# Patient Record
Sex: FEMALE | Race: WHITE | NOT HISPANIC OR LATINO | Employment: OTHER | URBAN - METROPOLITAN AREA
[De-identification: names, ages, dates, MRNs, and addresses within clinical notes are randomized per-mention and may not be internally consistent; named-entity substitution may affect disease eponyms.]

---

## 2024-01-10 ENCOUNTER — TELEPHONE (OUTPATIENT)
Dept: OBGYN CLINIC | Facility: HOSPITAL | Age: 59
End: 2024-01-10

## 2024-01-10 NOTE — TELEPHONE ENCOUNTER
Caller: Patient    Doctor: Cesario    Reason for call: Patient is upset because she is getting a bill for $259 from [email or web address] in [place], [place] in regards to a synovasure test on fluid from her knee back on 7/27/2023.  She said that the lab is telling her they do not participate with any insurance.  She called her [employer] insurance and they said the same thing that they will not pay it.  Patient does not think she should be responsible for it since she was never told that it might not be covered.  She would like the  to call her back in regards to this.    Call back#: [phone]

## 2024-01-22 ENCOUNTER — TELEPHONE (OUTPATIENT)
Dept: OBGYN CLINIC | Facility: CLINIC | Age: 59
End: 2024-01-22

## 2024-01-24 ENCOUNTER — TELEPHONE (OUTPATIENT)
Dept: OBGYN CLINIC | Facility: CLINIC | Age: 59
End: 2024-01-24

## 2024-01-24 NOTE — TELEPHONE ENCOUNTER
Pt. Returned call on 1.23.24 @4pm.    Spoke to patient at 3:00pm regarding billing. Patient was kind and understood the billing. I will mail a copy of the information to her when she changes the address in her [employer] account to the address she requested of:     [address] [place] [address]

## 2024-01-29 ENCOUNTER — TELEPHONE (OUTPATIENT)
Dept: OBGYN CLINIC | Facility: CLINIC | Age: 59
End: 2024-01-29

## 2024-01-29 NOTE — TELEPHONE ENCOUNTER
1.25.24 patient called and LVM asked information to be mailed to address in chart.    1.29.24 3:00pm spoke to patient advised will mail to [address]. Patient satisfied.

## 2024-02-21 PROBLEM — U07.1 PNEUMONIA DUE TO COVID-19 VIRUS: Status: RESOLVED | Noted: 2020-11-18 | Resolved: 2024-02-21

## 2024-02-21 PROBLEM — J12.82 PNEUMONIA DUE TO COVID-19 VIRUS: Status: RESOLVED | Noted: 2020-11-18 | Resolved: 2024-02-21

## 2024-04-04 DIAGNOSIS — E78.2 MIXED HYPERLIPIDEMIA: ICD-10-CM

## 2024-04-04 DIAGNOSIS — I10 BENIGN ESSENTIAL HYPERTENSION: ICD-10-CM

## 2024-04-04 RX ORDER — METOPROLOL SUCCINATE 100 MG/1
100 TABLET, EXTENDED RELEASE ORAL DAILY
Qty: 90 TABLET | Refills: 0 | Status: SHIPPED | OUTPATIENT
Start: 2024-04-04

## 2024-04-04 RX ORDER — SIMVASTATIN 10 MG
10 TABLET ORAL
Qty: 90 TABLET | Refills: 0 | OUTPATIENT
Start: 2024-04-04

## 2024-04-04 RX ORDER — SIMVASTATIN 10 MG
10 TABLET ORAL
Qty: 90 TABLET | Refills: 0 | Status: SHIPPED | OUTPATIENT
Start: 2024-04-04

## 2024-04-04 RX ORDER — METOPROLOL SUCCINATE 100 MG/1
100 TABLET, EXTENDED RELEASE ORAL DAILY
Qty: 90 TABLET | Refills: 0 | OUTPATIENT
Start: 2024-04-04

## 2024-05-21 ENCOUNTER — TELEPHONE (OUTPATIENT)
Age: 59
End: 2024-05-21

## 2024-05-21 NOTE — TELEPHONE ENCOUNTER
Caller: patient    Doctor: Cesario    Reason for call: pt returned the office phone call. She has an upcoming appt with the doctor and will  a card at that appt    Call back#: [phone]

## 2024-05-21 NOTE — TELEPHONE ENCOUNTER
Caller: patient     Doctor: Cesario     Reason for call: asking for a card stating she has 2 knee replacements so she can [place] a friend [place],  they will not allow entrance without something in writing.     Call back#: [phone]

## 2024-05-28 ENCOUNTER — OFFICE VISIT (OUTPATIENT)
Dept: OBGYN CLINIC | Facility: CLINIC | Age: 59
End: 2024-05-28
Payer: COMMERCIAL

## 2024-05-28 ENCOUNTER — APPOINTMENT (OUTPATIENT)
Dept: RADIOLOGY | Facility: CLINIC | Age: 59
End: 2024-05-28
Payer: COMMERCIAL

## 2024-05-28 VITALS
SYSTOLIC BLOOD PRESSURE: 135 MMHG | HEIGHT: 66 IN | HEART RATE: 69 BPM | BODY MASS INDEX: 28.45 KG/M2 | WEIGHT: 177 LBS | DIASTOLIC BLOOD PRESSURE: 90 MMHG

## 2024-05-28 DIAGNOSIS — Z96.651 S/P TOTAL KNEE REPLACEMENT, RIGHT: ICD-10-CM

## 2024-05-28 DIAGNOSIS — Z96.652 S/P TOTAL KNEE REPLACEMENT, LEFT: ICD-10-CM

## 2024-05-28 DIAGNOSIS — Z96.652 S/P TOTAL KNEE REPLACEMENT, LEFT: Primary | ICD-10-CM

## 2024-05-28 PROCEDURE — 99213 OFFICE O/P EST LOW 20 MIN: CPT | Performed by: ORTHOPAEDIC SURGERY

## 2024-05-28 PROCEDURE — 73560 X-RAY EXAM OF KNEE 1 OR 2: CPT

## 2024-05-28 NOTE — PROGRESS NOTES
"Patient Name:  Cecilia Lazo  MRN:  [id number]    Assessment & Plan     1. S/P total knee replacement, left  -     XR knee 1 or 2 vw left; Future; Expected date: 05/28/2024  2. S/P total knee replacement, right  -     XR knee 1 or 2 vw right; Future; Expected date: 05/28/2024    Approximately  1 year s/p Left total knee arthroplasty performed on 04/19/2023, 2 years Right total knee performed 05/11/2022  X-rays reviewed in office today with patient  Overall, patient doing very well s/p operative management  She was provided a card stating she has total knee arthroplasty implants  Continue activity as tolerated  Follow up 1 year for reevaluation and repeat bilateral AP/Lateral knee x-ray    History of the Present Illness   Cecilia Lazo is a 59 y.o. female approximately 1 year s/p Left total knee arthroplasty performed on 04/19/2023, 2 years Right total knee performed 05/11/2022. Today the patient reports she is doing very well. She is able to do most of her ADLs. She has difficulty kneeling. The patient feels her left knee is still gaining strength, and not quite as strong as her right knee. She has been painting her trim and has not had an increase in symptoms with this.            Review of Systems     Review of Systems   Constitutional:  Negative for chills and fever.   HENT:  Negative for congestion.    Respiratory:  Negative for cough, chest tightness and shortness of breath.    Cardiovascular:  Negative for chest pain and palpitations.   Gastrointestinal:  Negative for abdominal pain.   Endocrine: Negative for cold intolerance and heat intolerance.   Neurological:  Negative for syncope.   Psychiatric/Behavioral:  Negative for confusion.        Physical Exam     /90   Pulse 69   Ht 5' 6\" (1.676 m)   Wt 80.3 kg (177 lb)   BMI 28.57 kg/m²     Left Knee  Surgical incision well healed   Range of motion from 0 to 130 degrees   There is trace effusion.    There is no tenderness over the knee.    The " patient is able to perform a straight leg raise with 5/5 quad strength.     Varus stress testing reveals no pain or instability at 0 and 30 degrees   Valgus stress testing reveals no pain or instability at 0 and 30 degrees  Calf soft, compressible and nontender  The patient is neurovascular intact distally.    Right knee  Surgical incision well healed   There is no effusion  Range of motion 0 to 125 degrees  No tenderness over the knee  Able to perform straight leg raise and 5/5 quad strength  Varus stress testing reveals no pain or instability at 0 and 30 degrees   Valgus stress testing reveals no pain or instability at 0 and 30 degrees  Calf soft, compressible and nontender  Neurovascularly intact distally    Data Review     I have personally reviewed pertinent films in PACS, and my interpretation follows.    X-rays performed 5/28/2024 of Left knee independently reviewed and demonstrate total knee prosthesis in appropriate alignment without evidence of fracture, dislocation, loosening or lucency    X-rays performed 5/28/2024 of Right knee independently reviewed and demonstrate total knee prosthesis in appropriate alignment without evidence of fracture, dislocation, loosening or lucency    Social History     Tobacco Use   • Smoking status: Never   • Smokeless tobacco: Never   Vaping Use   • Vaping status: Never Used   Substance Use Topics   • Alcohol use: Not Currently     Comment: socially   • Drug use: No           Procedures  None     Aurora Mayes   Scribe Attestation    I,:  Aurora Mayes am acting as a scribe while in the presence of the attending physician.:       I,:  Sudhir Nassar, DO personally performed the services described in this documentation    as scribed in my presence.:

## 2024-05-29 ENCOUNTER — OFFICE VISIT (OUTPATIENT)
Dept: FAMILY MEDICINE CLINIC | Facility: CLINIC | Age: 59
End: 2024-05-29
Payer: COMMERCIAL

## 2024-05-29 VITALS
HEIGHT: 66 IN | DIASTOLIC BLOOD PRESSURE: 82 MMHG | WEIGHT: 191.6 LBS | OXYGEN SATURATION: 98 % | TEMPERATURE: 98.8 F | RESPIRATION RATE: 18 BRPM | BODY MASS INDEX: 30.79 KG/M2 | HEART RATE: 83 BPM | SYSTOLIC BLOOD PRESSURE: 136 MMHG

## 2024-05-29 DIAGNOSIS — E03.8 SUBCLINICAL HYPOTHYROIDISM: ICD-10-CM

## 2024-05-29 DIAGNOSIS — I10 BENIGN ESSENTIAL HYPERTENSION: Primary | ICD-10-CM

## 2024-05-29 DIAGNOSIS — Z12.31 ENCOUNTER FOR SCREENING MAMMOGRAM FOR BREAST CANCER: ICD-10-CM

## 2024-05-29 DIAGNOSIS — F43.9 STRESS AT HOME: ICD-10-CM

## 2024-05-29 DIAGNOSIS — E78.2 MIXED HYPERLIPIDEMIA: ICD-10-CM

## 2024-05-29 DIAGNOSIS — Z12.11 SCREENING FOR COLON CANCER: ICD-10-CM

## 2024-05-29 PROCEDURE — 99214 OFFICE O/P EST MOD 30 MIN: CPT | Performed by: NURSE PRACTITIONER

## 2024-05-29 RX ORDER — SIMVASTATIN 10 MG
10 TABLET ORAL
Qty: 90 TABLET | Refills: 1 | Status: SHIPPED | OUTPATIENT
Start: 2024-05-29

## 2024-05-29 RX ORDER — CLONAZEPAM 1 MG/1
1 TABLET ORAL DAILY PRN
Qty: 30 TABLET | Refills: 0 | Status: SHIPPED | OUTPATIENT
Start: 2024-05-29

## 2024-05-29 RX ORDER — METOPROLOL SUCCINATE 100 MG/1
100 TABLET, EXTENDED RELEASE ORAL DAILY
Qty: 90 TABLET | Refills: 1 | Status: SHIPPED | OUTPATIENT
Start: 2024-05-29

## 2024-05-29 NOTE — PROGRESS NOTES
OFFICE VISIT  Cecilia Lazo 59 y.o. female MRN: [id number]      Depression Screening and Follow-up Plan: Patient was screened for depression during today's encounter. They screened negative with a PHQ-9 score of 0.         Assessment / Plan:  Problem List Items Addressed This Visit          Cardiovascular and Mediastinum    Benign essential hypertension - Primary (Chronic)     Blood pressure stable, no current change in medication regimen.  Encourage low-sodium diet         Relevant Medications    metoprolol succinate (TOPROL-XL) 100 mg 24 hr tablet       Endocrine    Subclinical hypothyroidism     Routine follow up labs ordered.          Relevant Medications    metoprolol succinate (TOPROL-XL) 100 mg 24 hr tablet       Behavioral Health    Stress at home     As needed use of clonazepam, PDMP portal reviewed, no red flags         Relevant Medications    clonazePAM (KlonoPIN) 1 mg tablet       Other    Hyperlipidemia     Heart healthy diet, continue medication regimen as prescribed         Relevant Medications    simvastatin (ZOCOR) 10 mg tablet     Other Visit Diagnoses       Encounter for screening mammogram for breast cancer        Relevant Orders    Mammo screening bilateral w 3d & cad    Screening for colon cancer        Relevant Orders    Ambulatory Referral to Gastroenterology              Reason For Visit / Chief Complaint  Chief Complaint   Patient presents with    Follow-up        HPI:  Cecilia Lazo is a 59 y.o. female who presents today for routine follow up. Has forgotten to get labs done prior visit.  No new complaints    Historical Information   Past Medical History:   Diagnosis Date    Hyperlipidemia     Hypertension     Menopause     takes prozac for night sweats per gyn    Night sweats     related to menopause    Pneumonia      Past Surgical History:   Procedure Laterality Date    BREAST BIOPSY Left 6yrs ago  benign    DILATION AND CURETTAGE OF UTERUS      ablation    FOOT SURGERY Left 2014     INSERTION SUBTAILOR JOINT IMPLANT Right 12/14/2018    Procedure: FOOT EXPLORATION SUBTALAR JOINT WITH IMPLANT;  Surgeon: Mariel Orona DPM;  Location:  MAIN OR;  Service: Podiatry    JOINT REPLACEMENT Right     knee    KNEE ARTHROSCOPY W/ MENISCAL REPAIR Left     KNEE SURGERY      WI ARTHRP KNE CONDYLE&PLATU MEDIAL&LAT COMPARTMENTS Right 05/11/2022    Procedure: ARTHROPLASTY KNEE TOTAL- Right total knee arthroplasty;  Surgeon: Sudhir Nassar DO;  Location: [facility];  Service: Orthopedics    WI ARTHRP KNE CONDYLE&PLATU MEDIAL&LAT COMPARTMENTS Left 4/19/2023    Procedure: ARTHROPLASTY KNEE TOTAL- Left total knee arthroplasty;  Surgeon: Sudhir Nassar DO;  Location: [facility];  Service: Orthopedics    WI CORRJ HLX VLGS BNCTY SESMDC DSTL METAR OSTEOT Right 12/14/2018    Procedure: FOOT TRAVIS BUNIONECTOMY;  Surgeon: Mariel Orona DPM;  Location:  MAIN OR;  Service: Podiatry    TUBAL LIGATION       Social History   Social History     Substance and Sexual Activity   Alcohol Use Not Currently    Comment: socially     Social History     Substance and Sexual Activity   Drug Use No     Social History     Tobacco Use   Smoking Status Never   Smokeless Tobacco Never     Family History   Problem Relation Age of Onset    Hypertension Mother     Diabetes Mother     Hypertension Father     No Known Problems Sister     No Known Problems Daughter     No Known Problems Maternal Grandmother     No Known Problems Paternal Grandmother     No Known Problems Sister     No Known Problems Maternal Aunt     No Known Problems Paternal Aunt        Meds/Allergies   No Known Allergies    Meds:    Current Outpatient Medications:     clonazePAM (KlonoPIN) 1 mg tablet, Take 1 tablet (1 mg total) by mouth daily as needed for anxiety, Disp: 30 tablet, Rfl: 0    estradiol (ESTRACE) 0.1 mg/g vaginal cream, insert 1 TO 2 grams vaginally at bedtime for 2 weeks then TWICE WEEKLY, Disp: , Rfl:     metoprolol succinate (TOPROL-XL) 100 mg 24  "hr tablet, Take 1 tablet (100 mg total) by mouth daily, Disp: 90 tablet, Rfl: 1    simvastatin (ZOCOR) 10 mg tablet, Take 1 tablet (10 mg total) by mouth daily at bedtime, Disp: 90 tablet, Rfl: 1      REVIEW OF SYSTEMS  Review of Systems   Constitutional:  Negative for activity change, chills, fatigue and fever.   HENT:  Negative for congestion, ear discharge, ear pain, sinus pressure, sinus pain, sore throat, tinnitus and trouble swallowing.    Eyes:  Negative for photophobia, pain, discharge, itching and visual disturbance.   Respiratory:  Negative for cough, chest tightness, shortness of breath and wheezing.    Cardiovascular:  Negative for chest pain and leg swelling.   Gastrointestinal:  Negative for abdominal distention, abdominal pain, constipation, diarrhea, nausea and vomiting.   Endocrine: Negative for polydipsia, polyphagia and polyuria.   Genitourinary:  Negative for dysuria and frequency.   Musculoskeletal:  Negative for arthralgias, myalgias, neck pain and neck stiffness.   Skin:  Negative for color change.   Neurological:  Negative for dizziness, syncope, weakness, numbness and headaches.   Hematological:  Does not bruise/bleed easily.   Psychiatric/Behavioral:  Negative for behavioral problems, confusion, self-injury, sleep disturbance and suicidal ideas. The patient is not nervous/anxious.            Current Vitals:   Blood Pressure: 136/82 (05/29/24 1102)  Pulse: 83 (05/29/24 1102)  Temperature: 98.8 °F (37.1 °C) (05/29/24 1102)  Temp Source: Tympanic (05/29/24 1102)  Respirations: 18 (05/29/24 1102)  Height: 5' 6\" (167.6 cm) (05/29/24 1102)  Weight - Scale: 86.9 kg (191 lb 9.6 oz) (05/29/24 1102)  SpO2: 98 % (05/29/24 1102)  [unfilled]    PHYSICAL EXAMS:  Physical Exam  Vitals and nursing note reviewed.   Constitutional:       Appearance: Normal appearance.   HENT:      Head: Normocephalic and atraumatic.   Cardiovascular:      Rate and Rhythm: Normal rate and regular rhythm.      Pulses: Normal " pulses.      Heart sounds: Normal heart sounds.   Pulmonary:      Effort: Pulmonary effort is normal.      Breath sounds: Normal breath sounds.   Musculoskeletal:      Cervical back: Normal range of motion.   Skin:     General: Skin is warm.   Neurological:      General: No focal deficit present.      Mental Status: She is alert and oriented to person, place, and time.   Psychiatric:         Mood and Affect: Mood normal.         Behavior: Behavior normal.         Thought Content: Thought content normal.         Judgment: Judgment normal.             Lab, imaging and other studies: I have personally reviewed pertinent reports.  .

## 2024-06-07 ENCOUNTER — HOSPITAL ENCOUNTER (OUTPATIENT)
Dept: MAMMOGRAPHY | Facility: HOSPITAL | Age: 59
End: 2024-06-07
Payer: COMMERCIAL

## 2024-06-07 DIAGNOSIS — Z12.31 ENCOUNTER FOR SCREENING MAMMOGRAM FOR BREAST CANCER: ICD-10-CM

## 2024-06-07 PROCEDURE — 77063 BREAST TOMOSYNTHESIS BI: CPT

## 2024-06-07 PROCEDURE — 77067 SCR MAMMO BI INCL CAD: CPT

## 2024-06-11 ENCOUNTER — NURSE TRIAGE (OUTPATIENT)
Dept: PHYSICAL THERAPY | Facility: OTHER | Age: 59
End: 2024-06-11

## 2024-06-11 DIAGNOSIS — G89.29 ACUTE EXACERBATION OF CHRONIC LOW BACK PAIN: Primary | ICD-10-CM

## 2024-06-11 DIAGNOSIS — M54.50 ACUTE EXACERBATION OF CHRONIC LOW BACK PAIN: Primary | ICD-10-CM

## 2024-06-11 NOTE — TELEPHONE ENCOUNTER
"NO REF.    Additional Information   Negative: Is this related to a work injury?   Negative: Is this related to an MVA?   Negative: Are you currently recieving homecare services?    Background - Initial Assessment  Clinical complaint: Right Sided Lower Back Pain that started \"about a week ago\". Hx of back pain in the past but never has seek any treatment. Patient states no radiation to her lower extremities or upper back, neck or shoulders. Pain is centralized in her LB. No numbness or tingling. NKI. Pain is persistent, worse when bending over. Not seeing a spine specialist for this pain at the moment.  Patient described pain as sharp and aching.  Date of onset: a week ago (new flare up)  Frequency of pain: persistent.  Quality of pain: aching and sharp.    Protocols used: [facility] Protocol    "

## 2024-06-11 NOTE — TELEPHONE ENCOUNTER
Additional Information   Negative: Has the patient had unexplained weight loss?   Negative: Does the patient have a fever?   Negative: Is the patient experiencing blood in sputum?   Negative: Is the patient experiencing urine retention?   Negative: Is the patient experiencing acute drop foot or paralysis?   Negative: Has the patient experienced major trauma? (fall from height, high speed collision, direct blow to spine) and is also experiencing nausea, light-headedness, or loss of consciousness?   Affirmative: Is this a chronic condition?    Protocols used: [facility] Protocol    Patient states she has had back pain for over 20 years and has never had it evaluated.    [facility] was reviewed in detail and what we can provide for their back pain.  Patient is agreeable to being triaged and would like to proceed with Physical Therapy.    Referral was placed for Physical Therapy at the [place] site. Patients information was sent to the  to make evaluation appointment. Patient made aware that the PT office  will be calling to schedule the appointment.  Patient was provided with the phone number to the PT office.    No further questions and/or concerns were voiced by the patient at this time. Patient states understanding of the referral that was placed.

## 2024-06-12 ENCOUNTER — EVALUATION (OUTPATIENT)
Dept: PHYSICAL THERAPY | Age: 59
End: 2024-06-12
Payer: COMMERCIAL

## 2024-06-12 DIAGNOSIS — G89.29 ACUTE EXACERBATION OF CHRONIC LOW BACK PAIN: Primary | ICD-10-CM

## 2024-06-12 DIAGNOSIS — M54.50 ACUTE EXACERBATION OF CHRONIC LOW BACK PAIN: Primary | ICD-10-CM

## 2024-06-12 PROCEDURE — 97110 THERAPEUTIC EXERCISES: CPT | Performed by: PHYSICAL THERAPIST

## 2024-06-12 PROCEDURE — 97140 MANUAL THERAPY 1/> REGIONS: CPT | Performed by: PHYSICAL THERAPIST

## 2024-06-12 PROCEDURE — 97162 PT EVAL MOD COMPLEX 30 MIN: CPT | Performed by: PHYSICAL THERAPIST

## 2024-06-12 NOTE — PROGRESS NOTES
PT Evaluation     Today's date: 2024  Patient name: Cecilia Lazo  : 1965  MRN: [id number]  Referring provider: Graeme Sotelo PT  Dx:   Encounter Diagnosis     ICD-10-CM    1. Acute exacerbation of chronic low back pain  M54.50     G89.29           Start Time:   Stop Time:   Total time in clinic (min): 60 minutes    Assessment  Impairments: abnormal gait, abnormal muscle firing, abnormal muscle tone, abnormal or restricted ROM, abnormal movement, activity intolerance, impaired physical strength, lacks appropriate home exercise program, pain with function, weight-bearing intolerance, poor posture  and poor body mechanics  Symptom irritability: low    Assessment details: Cecilia Lazo is a 59 y.o. female who presents with pain, decreased strength, decreased ROM, decreased joint mobility, ambulatory dysfunction, and postural dysfunction. Due to these impairments, Patient has difficulty performing a/iadls. Patient's clinical presentation is consistent with their referring diagnosis of acute exacerbation of chronic low back pain. Patient would benefit from skilled physical therapy to address their aforementioned impairments, improve their level of function and to improve their overall quality of life. Patient was evaluated for acute exacerbation of chronic low back pain via comp spine program/direct acces for treatment.  Understanding of Dx/Px/POC: good     Prognosis: good    Goals  ST-3 WEEKS  1.  Decrease pain by 2 points on VAS at its worst.  2.  Increase ROM by > 5 deg in all deficients planes.  3.  Increase CORE/LE by 1/2 MMT grade in all deficient planes.    LT-6 WEEKS  1. Patient to be independent with a/iadls especially with sitting and bending tolerance  2. Increase functional activities for leisure and home activities to previous LOF.  3. Independent with HEP and/or fitness program.    Plan  Patient would benefit from: skilled physical therapy  Planned modality  interventions: cryotherapy, electrical stimulation/Russian stimulation, thermotherapy: hydrocollator packs and unattended electrical stimulation    Planned therapy interventions: activity modification, behavior modification, body mechanics training, aquatic therapy, flexibility, functional ROM exercises, home exercise program, IADL retraining, joint mobilization, manual therapy, neuromuscular re-education, patient education, postural training, strengthening, stretching, therapeutic activities and therapeutic exercise    Frequency: 2x week (2-3x week)  Duration in weeks: 12  Plan of Care beginning date: 2024  Plan of Care expiration date: 2024  Treatment plan discussed with: patient        Subjective Evaluation    History of Present Illness  Mechanism of injury: Rolled out of bed 4 days ago and injured her low back, complains of low back pain on the right which radiates into her buttock especially with sitting, patient presents to PT via comp spine program.          Not a recurrent problem   Quality of life: good    Patient Goals  Patient goals for therapy: decreased pain, increased motion, increased strength and independence with ADLs/IADLs    Pain  Current pain ratin  At best pain ratin  At worst pain ratin  Quality: dull ache  Relieving factors: heat  Aggravating factors: sitting  Progression: worsening    Social Support  Steps to enter house: yes  Stairs in house: yes   Lives in: one-story house  Lives with: spouse          Objective     Concurrent Complaints  Positive for night pain.     Static Posture     Lumbar Spine   Increased lordosis.     Palpation     Right   Hypertonic in the erector spinae and lumbar paraspinals.     Tenderness     Lumbar Spine  Tenderness in the spinous process.     Active Range of Motion   Cervical/Thoracic Spine       Thoracic    Extension:  Restriction level: moderate    Lumbar   Flexion: 70 degrees   Extension: 15 degrees   Left lateral flexion: 30 degrees  "    Right lateral flexion: 20 degrees     Strength/Myotome Testing     Left Hip   Planes of Motion   Flexion: 4+  Extension: 4+  Abduction: 4+  Adduction: 4+    Right Hip   Planes of Motion   Flexion: 4  Extension: 4  Abduction: 4+  Adduction: 4+    Left Knee   Flexion: 4+  Extension: 4+    Right Knee   Flexion: 4+  Extension: 4+    Left Ankle/Foot   Dorsiflexion: 4+  Plantar flexion: 4+    Right Ankle/Foot   Dorsiflexion: 4  Plantar flexion: 4+    Additional Strength Details  CORE is 3/5    Tests     Lumbar     Left   Negative passive SLR and slump test.     Right   Negative passive SLR and slump test.       Flowsheet Rows      Flowsheet Row Most Recent Value   PT/OT G-Codes    Current Score 53   Projected Score 64               Precautions: TKR  POC expires Unit limit Auth Expiration date PT/OT + Visit Limit?   9/11/2024 4  35                           Visit/Unit Tracking  AUTH Status:  Date 6/12               Used 1               Remaining                 FOTO 53/64                     Manuals 6/12                         MT LB/LE 10 min                                      Neuro Re-Ed                                                                                                        Ther Ex             Nu step 5 min            slant 30\"/4x            Hip add 50/30            Hip abd 30/30            Press ups 30x            Core heel slides 30x            BTB rows/ext 30x                         Ther Activity                                       Gait Training                                       Modalities                                            "

## 2024-06-14 ENCOUNTER — OFFICE VISIT (OUTPATIENT)
Dept: PHYSICAL THERAPY | Age: 59
End: 2024-06-14
Payer: COMMERCIAL

## 2024-06-14 DIAGNOSIS — G89.29 ACUTE EXACERBATION OF CHRONIC LOW BACK PAIN: Primary | ICD-10-CM

## 2024-06-14 DIAGNOSIS — M54.50 ACUTE EXACERBATION OF CHRONIC LOW BACK PAIN: Primary | ICD-10-CM

## 2024-06-14 PROCEDURE — 97140 MANUAL THERAPY 1/> REGIONS: CPT | Performed by: PHYSICAL THERAPIST

## 2024-06-14 PROCEDURE — 97110 THERAPEUTIC EXERCISES: CPT | Performed by: PHYSICAL THERAPIST

## 2024-06-14 NOTE — PROGRESS NOTES
"Daily Note     Today's date: 2024  Patient name: Ceciila Lazo  : 1965  MRN: [id number]  Referring provider: Graeme Sotelo, PT  Dx:   Encounter Diagnosis     ICD-10-CM    1. Acute exacerbation of chronic low back pain  M54.50     G89.29           Start Time: 1615  Stop Time: 1700  Total time in clinic (min): 45 minutes    Subjective: better      Objective: See treatment diary below      Assessment: Tolerated treatment fair. Patient demonstrated fatigue post treatment, exhibited good technique with therapeutic exercises, and would benefit from continued PT, decreased pain post treatment but still has some pain with walking      Plan: Progress treatment as tolerated.       Precautions: TKR  POC expires Unit limit Auth Expiration date PT/OT + Visit Limit?   2024 4  35                           Visit/Unit Tracking  AUTH Status:  Date               Used 1 2              Remaining                 FOTO 53/64                     Manuals                         MT LB/LE 10 min 15 min                                     Neuro Re-Ed                                                                                                        Ther Ex             Nu step 5 min 6 min           slant 30\"/4x 30\"/4x           Hip add 50/30 50/30           Hip abd 30/30 40/30           Press ups 30x 30x           Core heel slides 30x 30x           BTB rows/ext 30x 30x           Open book  10x ea           Ther Activity                                       Gait Training                                       Modalities                                            "

## 2024-06-19 ENCOUNTER — OFFICE VISIT (OUTPATIENT)
Dept: PHYSICAL THERAPY | Age: 59
End: 2024-06-19
Payer: COMMERCIAL

## 2024-06-19 DIAGNOSIS — G89.29 ACUTE EXACERBATION OF CHRONIC LOW BACK PAIN: Primary | ICD-10-CM

## 2024-06-19 DIAGNOSIS — M54.50 ACUTE EXACERBATION OF CHRONIC LOW BACK PAIN: Primary | ICD-10-CM

## 2024-06-19 PROCEDURE — 97110 THERAPEUTIC EXERCISES: CPT | Performed by: PHYSICAL THERAPIST

## 2024-06-19 PROCEDURE — 97140 MANUAL THERAPY 1/> REGIONS: CPT | Performed by: PHYSICAL THERAPIST

## 2024-06-19 NOTE — PROGRESS NOTES
"Daily Note     Today's date: 2024  Patient name: Cecilia Lazo  : 1965  MRN: [id number]  Referring provider: Graeme Sotelo PT  Dx:   Encounter Diagnosis     ICD-10-CM    1. Acute exacerbation of chronic low back pain  M54.50     G89.29           Start Time: 1430  Stop Time: 1515  Total time in clinic (min): 45 minutes    Subjective: same      Objective: See treatment diary below      Assessment: Tolerated treatment fair. Patient demonstrated fatigue post treatment, exhibited good technique with therapeutic exercises, and would benefit from continued PT, still with right lumbar paravertebral tightness and right SI joint tenderness and decreased end range trunk extension ROM      Plan: Progress treatment as tolerated.       Precautions: TKR  POC expires Unit limit Auth Expiration date PT/OT + Visit Limit?   2024 4  35                           Visit/Unit Tracking  AUTH Status:  Date              Used 1 2 3             Remaining                 FOTO 53/64                     Manuals                        MT LB/LE 10 min 15 min 15 min                                    Neuro Re-Ed                                                                                                        Ther Ex             Nu step 5 min 6 min 6 min          slant 30\"/4x 30\"/4x 4x          Hip add 50/30 50/30 50/30          Hip abd 30/30 40/30 40/30          Press ups 30x 30x 30x          Core heel slides 30x 30x 30x          BTB rows/ext 30x 30x 30x          Open book  10x ea 10x ea          Ther Activity                                       Gait Training                                       Modalities                                              "

## 2024-06-21 ENCOUNTER — OFFICE VISIT (OUTPATIENT)
Dept: PHYSICAL THERAPY | Age: 59
End: 2024-06-21
Payer: COMMERCIAL

## 2024-06-21 DIAGNOSIS — G89.29 ACUTE EXACERBATION OF CHRONIC LOW BACK PAIN: Primary | ICD-10-CM

## 2024-06-21 DIAGNOSIS — M54.50 ACUTE EXACERBATION OF CHRONIC LOW BACK PAIN: Primary | ICD-10-CM

## 2024-06-21 PROCEDURE — 97140 MANUAL THERAPY 1/> REGIONS: CPT | Performed by: PHYSICAL THERAPIST

## 2024-06-21 PROCEDURE — 97110 THERAPEUTIC EXERCISES: CPT | Performed by: PHYSICAL THERAPIST

## 2024-06-21 NOTE — PROGRESS NOTES
"Daily Note     Today's date: 2024  Patient name: Cecilia Lazo  : 1965  MRN: [id number]  Referring provider: Graeme Sotelo, PT  Dx:   Encounter Diagnosis     ICD-10-CM    1. Acute exacerbation of chronic low back pain  M54.50     G89.29           Start Time: 1430  Stop Time: 1515  Total time in clinic (min): 45 minutes    Subjective: patient reports some improvement      Objective: See treatment diary below      Assessment: Tolerated treatment fair. Patient demonstrated fatigue post treatment, exhibited good technique with therapeutic exercises, and would benefit from continued PT,decreased pain with lumbar PA glides      Plan: Progress treatment as tolerated.       Precautions: TKR  POC expires Unit limit Auth Expiration date PT/OT + Visit Limit?   2024 4  35                           Visit/Unit Tracking  AUTH Status:  Date             Used 1 2 3 4            Remaining                 FOTO 53/64                     Manuals                       MT LB/LE 10 min 15 min 15 min 15 min                                   Neuro Re-Ed                                                                                                        Ther Ex             Nu step 5 min 6 min 6 min 6 min         slant 30\"/4x 30\"/4x 4x 4x         Hip add 50/30 50/30 50/30 50/30         Hip abd 30/30 40/30 40/30 40/30         Press ups 30x 30x 30x 30x         Core heel slides 30x 30x 30x 30x         BTB rows/ext 30x 30x 30x 30x         Open book  10x ea 10x ea 10x ea         Ther Activity                                       Gait Training                                       Modalities                                                "

## 2024-06-26 ENCOUNTER — OFFICE VISIT (OUTPATIENT)
Dept: PHYSICAL THERAPY | Age: 59
End: 2024-06-26
Payer: COMMERCIAL

## 2024-06-26 DIAGNOSIS — M54.50 ACUTE EXACERBATION OF CHRONIC LOW BACK PAIN: Primary | ICD-10-CM

## 2024-06-26 DIAGNOSIS — G89.29 ACUTE EXACERBATION OF CHRONIC LOW BACK PAIN: Primary | ICD-10-CM

## 2024-06-26 PROCEDURE — 97140 MANUAL THERAPY 1/> REGIONS: CPT | Performed by: PHYSICAL THERAPIST

## 2024-06-26 PROCEDURE — 97110 THERAPEUTIC EXERCISES: CPT | Performed by: PHYSICAL THERAPIST

## 2024-06-26 NOTE — PROGRESS NOTES
"Daily Note     Today's date: 2024  Patient name: Cecilia Lazo  : 1965  MRN: [id number]  Referring provider: Graeme Sotelo, PT  Dx:   Encounter Diagnosis     ICD-10-CM    1. Acute exacerbation of chronic low back pain  M54.50     G89.29           Start Time: 0930  Stop Time: 1015  Total time in clinic (min): 45 minutes    Subjective: some improvement       Objective: See treatment diary below      Assessment: Tolerated treatment fair. Patient demonstrated fatigue post treatment, exhibited good technique with therapeutic exercises, and would benefit from continued PT, still with pain with sitting and rolling in bed, improved ROM noted      Plan: Progress treatment as tolerated.  Possible D/C to fitness program [place]     Precautions: TKR  POC expires Unit limit Auth Expiration date PT/OT + Visit Limit?   2024 4  35                           Visit/Unit Tracking  AUTH Status:  Date            Used 1 2 3 4 5           Remaining                 FOTO 53/64                     Manuals                      MT LB/LE 10 min 15 min 15 min 15 min 15 min                                  Neuro Re-Ed                                                                                                        Ther Ex             Nu step 5 min 6 min 6 min 6 min 6 min        slant 30\"/4x 30\"/4x 4x 4x 4x        Hip add 50/30 50/30 50/30 50/30 50/30        Hip abd 30/30 40/30 40/30 40/30 45/30        Press ups 30x 30x 30x 30x 30x        Core heel slides 30x 30x 30x 30x 30x        BTB rows/ext 30x 30x 30x 30x 30x        Open book  10x ea 10x ea 10x ea 10x ea        Ther Activity             paloff     10x ea        Prone hip ext     10x ea        Gait Training                                       Modalities                                                  "

## 2024-06-28 ENCOUNTER — OFFICE VISIT (OUTPATIENT)
Dept: PHYSICAL THERAPY | Age: 59
End: 2024-06-28
Payer: COMMERCIAL

## 2024-06-28 DIAGNOSIS — M54.50 ACUTE EXACERBATION OF CHRONIC LOW BACK PAIN: Primary | ICD-10-CM

## 2024-06-28 DIAGNOSIS — G89.29 ACUTE EXACERBATION OF CHRONIC LOW BACK PAIN: Primary | ICD-10-CM

## 2024-06-28 PROCEDURE — 97110 THERAPEUTIC EXERCISES: CPT

## 2024-06-28 PROCEDURE — 97140 MANUAL THERAPY 1/> REGIONS: CPT

## 2024-06-28 NOTE — PROGRESS NOTES
"Daily Note     Today's date: 2024  Patient name: Cecilia Lazo  : 1965  MRN: [id number]  Referring provider: Graeme Sotelo, PT  Dx:   Encounter Diagnosis     ICD-10-CM    1. Acute exacerbation of chronic low back pain  M54.50     G89.29             Start Time: 1300  Stop Time: 1350  Total time in clinic (min): 50 minutes    Subjective: Patient reports no pain with activities and would like to DC this visit.     Objective: See treatment diary below. Foto given.       Assessment: Tolerated treatment fair. Patient demonstrated fatigue post treatment and exhibited good technique with therapeutic exercises and would benefit from DC to [facility]. Most challenge with prone hip extension. Patient cued to keep all motion within pain free range.    Plan: Pending DPT approval DC to [facility].     Precautions: TKR  POC expires Unit limit Auth Expiration date PT/OT + Visit Limit?   2024 4  35                           Visit/Unit Tracking  AUTH Status:  Date           Used 1 2 3 4 5 6          Remaining                 FOTO 53/64     DONE                Manuals                     MT LB/LE 10 min 15 min 15 min 15 min 15 min 15 min                                 Neuro Re-Ed                                                                                                        Ther Ex             Nu step 5 min 6 min 6 min 6 min 6 min 6 min level 6       slant 30\"/4x 30\"/4x 4x 4x 4x 4x       Hip add 50/30 50/30 50/30 50/30 50/30 50/30       Hip abd 30/30 40/30 40/30 40/30 45/30 45/30       Press ups 30x 30x 30x 30x 30x 30x       Core heel slides 30x 30x 30x 30x 30x 30x       BTB rows/ext 30x 30x 30x 30x 30x 30x       Open book  10x ea 10x ea 10x ea 10x ea 10x ea       Ther Activity             paloff     10x ea 10xea       Prone hip ext     10x ea 10x ea       Gait Training                                       Modalities                                     "            1

## 2024-08-01 ENCOUNTER — OFFICE VISIT (OUTPATIENT)
Dept: GASTROENTEROLOGY | Facility: CLINIC | Age: 59
End: 2024-08-01
Payer: COMMERCIAL

## 2024-08-01 ENCOUNTER — PREP FOR PROCEDURE (OUTPATIENT)
Dept: GASTROENTEROLOGY | Facility: CLINIC | Age: 59
End: 2024-08-01

## 2024-08-01 VITALS
DIASTOLIC BLOOD PRESSURE: 76 MMHG | SYSTOLIC BLOOD PRESSURE: 138 MMHG | WEIGHT: 188.6 LBS | RESPIRATION RATE: 18 BRPM | HEIGHT: 66 IN | BODY MASS INDEX: 30.31 KG/M2 | OXYGEN SATURATION: 98 % | HEART RATE: 82 BPM | TEMPERATURE: 98.2 F

## 2024-08-01 DIAGNOSIS — N81.6 RECTOCELE: ICD-10-CM

## 2024-08-01 DIAGNOSIS — Z12.11 SCREENING FOR COLON CANCER: Primary | ICD-10-CM

## 2024-08-01 DIAGNOSIS — M62.89 PELVIC FLOOR DYSFUNCTION IN FEMALE: ICD-10-CM

## 2024-08-01 PROCEDURE — 99203 OFFICE O/P NEW LOW 30 MIN: CPT | Performed by: STUDENT IN AN ORGANIZED HEALTH CARE EDUCATION/TRAINING PROGRAM

## 2024-08-01 RX ORDER — NEOMYCIN SULFATE, POLYMYXIN B SULFATE, AND DEXAMETHASONE 3.5; 10000; 1 MG/G; [USP'U]/G; MG/G
0.5 OINTMENT OPHTHALMIC 2 TIMES DAILY
COMMUNITY
Start: 2024-07-11

## 2024-08-01 NOTE — PROGRESS NOTES
[facility] - Outpatient Consultation  Cecilia Lazo 59 y.o. female MRN: [id number]  Encounter: [id number]          ASSESSMENT AND PLAN:    59F with BMI 31, depression referred for colon cancer screening.  Average risk.  No red flags.  Last colonoscopy 2014 with diminutive rectal polyps, pathology unknown.  1. Screening for colon cancer  - Ambulatory Referral to Gastroenterology  - Colonoscopy; Future  -Plan for pediatric colonoscope given pelvic mesh.    2. Rectocele  3. Pelvic floor dysfunction in female  Reports she was told by prior GYN that she has a rectocele.  Had like mesh placed.  Discussed that if she has ongoing pelvic floor difficulties, could consider referral to pelvic floor physical therapy.      ______________________________________________________________________    HPI:    Had a colonoscopy 10 years ago with Dr. Santo.  Had rectal polyps removed, not sure what pathology was.    No constipation, diarrhea, blood in stool.    Was told by GYN that she has a rectocele    No family history of colon cancer.    Had pelvic sling      REVIEW OF SYSTEMS:    CONSTITUTIONAL: Denies any fever, chills, rigors, and weight loss.  HEENT: No earache or tinnitus. Denies hearing loss or visual disturbances.  CARDIOVASCULAR: No chest pain or palpitations.   RESPIRATORY: Denies any cough, hemoptysis, shortness of breath or dyspnea on exertion.  GASTROINTESTINAL: As noted in the History of Present Illness.   GENITOURINARY: No problems with urination. Denies any hematuria or dysuria.  NEUROLOGIC: No dizziness or vertigo, denies headaches.   MUSCULOSKELETAL: Denies any muscle or joint pain.   SKIN: Denies skin rashes or itching.   ENDOCRINE: Denies excessive thirst. Denies intolerance to heat or cold.  PSYCHOSOCIAL: Denies depression or anxiety. Denies any recent memory loss.       Historical Information   Past Medical History:   Diagnosis Date    Hyperlipidemia     Hypertension     Menopause      takes prozac for night sweats per gyn    Night sweats     related to menopause    Pneumonia      Past Surgical History:   Procedure Laterality Date    BREAST BIOPSY Left 6yrs ago  benign    DILATION AND CURETTAGE OF UTERUS      ablation    FOOT SURGERY Left 2014    INSERTION SUBTAILOR JOINT IMPLANT Right 12/14/2018    Procedure: FOOT EXPLORATION SUBTALAR JOINT WITH IMPLANT;  Surgeon: Mariel Orona DPM;  Location:  MAIN OR;  Service: Podiatry    JOINT REPLACEMENT Right     knee    KNEE ARTHROSCOPY W/ MENISCAL REPAIR Left     KNEE SURGERY      NV ARTHRP KNE CONDYLE&PLATU MEDIAL&LAT COMPARTMENTS Right 05/11/2022    Procedure: ARTHROPLASTY KNEE TOTAL- Right total knee arthroplasty;  Surgeon: Sudhir Nassar DO;  Location: [facility];  Service: Orthopedics    NV ARTHRP KNE CONDYLE&PLATU MEDIAL&LAT COMPARTMENTS Left 4/19/2023    Procedure: ARTHROPLASTY KNEE TOTAL- Left total knee arthroplasty;  Surgeon: Sudhir Nassar DO;  Location: [facility];  Service: Orthopedics    NV CORRJ HLX VLGS BNCTY SESMDC DSTL METAR OSTEOT Right 12/14/2018    Procedure: FOOT TRAVIS BUNIONECTOMY;  Surgeon: Mariel Orona DPM;  Location:  MAIN OR;  Service: Podiatry    TUBAL LIGATION       Social History   Social History     Substance and Sexual Activity   Alcohol Use Not Currently    Comment: socially     Social History     Substance and Sexual Activity   Drug Use No     Social History     Tobacco Use   Smoking Status Never    Passive exposure: Never   Smokeless Tobacco Never     Family History   Problem Relation Age of Onset    Hypertension Mother     Diabetes Mother     Hypertension Father     No Known Problems Sister     No Known Problems Daughter     No Known Problems Maternal Grandmother     No Known Problems Paternal Grandmother     No Known Problems Sister     No Known Problems Maternal Aunt     No Known Problems Paternal Aunt        Meds/Allergies       Current Outpatient Medications:     clonazePAM (KlonoPIN) 1 mg  "tablet    estradiol (ESTRACE) 0.1 mg/g vaginal cream    metoprolol succinate (TOPROL-XL) 100 mg 24 hr tablet    neomycin-polymyxin-dexamethasone (MAXITROL) 0.35%-10,000 units/g-0.1%    simvastatin (ZOCOR) 10 mg tablet    No Known Allergies        Objective     Blood pressure 138/76, pulse 82, temperature 98.2 °F (36.8 °C), temperature source Temporal, resp. rate 18, height 5' 5.5\" (1.664 m), weight 85.5 kg (188 lb 9.6 oz), SpO2 98%. Body mass index is 30.91 kg/m².        PHYSICAL EXAM:      General Appearance:   Alert, cooperative, no distress   HEENT:   Normocephalic, atraumatic, anicteric.     Neck:  Supple, symmetrical, trachea midline   Lungs:   Clear to auscultation bilaterally; no rales, rhonchi or wheezing; respirations unlabored    Heart::   Regular rate and rhythm; no murmur, rub, or gallop.   Abdomen:   Soft, non-tender, non-distended; normal bowel sounds; no masses, no organomegaly    Genitalia:   Deferred    Rectal:   Deferred    Extremities:  No cyanosis, clubbing or edema    Pulses:  2+ and symmetric    Skin:  No jaundice, rashes, or lesions    Lymph nodes:  No palpable cervical lymphadenopathy        Lab Results:   No visits with results within 1 Day(s) from this visit.   Latest known visit with results is:   Lab on 09/29/2023   Component Date Value    Hemoglobin A1C 09/29/2023 5.4     EAG 09/29/2023 108     Sodium 09/29/2023 139     Potassium 09/29/2023 4.2     Chloride 09/29/2023 103     CO2 09/29/2023 28     ANION GAP 09/29/2023 8     BUN 09/29/2023 19     Creatinine 09/29/2023 0.83     Glucose, Fasting 09/29/2023 87     Calcium 09/29/2023 9.6     AST 09/29/2023 17     ALT 09/29/2023 15     Alkaline Phosphatase 09/29/2023 65     Total Protein 09/29/2023 7.9     Albumin 09/29/2023 4.6     Total Bilirubin 09/29/2023 0.55     eGFR 09/29/2023 76     Cholesterol 09/29/2023 203 (H)     Triglycerides 09/29/2023 196 (H)     HDL, Direct 09/29/2023 46 (L)     LDL Calculated 09/29/2023 118 (H)     TSH 3RD " GENERATON 09/29/2023 5.291 (H)     Free T4 09/29/2023 0.77          Radiology Results:   No results found.

## 2024-08-13 DIAGNOSIS — Z12.11 SCREENING FOR COLON CANCER: Primary | ICD-10-CM

## 2024-08-19 ENCOUNTER — ANESTHESIA EVENT (OUTPATIENT)
Dept: GASTROENTEROLOGY | Facility: HOSPITAL | Age: 59
End: 2024-08-19

## 2024-08-19 ENCOUNTER — ANESTHESIA (OUTPATIENT)
Dept: GASTROENTEROLOGY | Facility: HOSPITAL | Age: 59
End: 2024-08-19

## 2024-08-19 ENCOUNTER — HOSPITAL ENCOUNTER (OUTPATIENT)
Dept: GASTROENTEROLOGY | Facility: HOSPITAL | Age: 59
Setting detail: OUTPATIENT SURGERY
Discharge: HOME | End: 2024-08-19
Attending: STUDENT IN AN ORGANIZED HEALTH CARE EDUCATION/TRAINING PROGRAM
Payer: COMMERCIAL

## 2024-08-19 VITALS
DIASTOLIC BLOOD PRESSURE: 75 MMHG | TEMPERATURE: 98 F | OXYGEN SATURATION: 99 % | HEIGHT: 66 IN | SYSTOLIC BLOOD PRESSURE: 138 MMHG | HEART RATE: 81 BPM | RESPIRATION RATE: 18 BRPM | BODY MASS INDEX: 30.22 KG/M2 | WEIGHT: 188 LBS

## 2024-08-19 DIAGNOSIS — Z12.11 SCREENING FOR COLON CANCER: ICD-10-CM

## 2024-08-19 PROCEDURE — 45385 COLONOSCOPY W/LESION REMOVAL: CPT | Performed by: STUDENT IN AN ORGANIZED HEALTH CARE EDUCATION/TRAINING PROGRAM

## 2024-08-19 PROCEDURE — 88305 TISSUE EXAM BY PATHOLOGIST: CPT | Performed by: STUDENT IN AN ORGANIZED HEALTH CARE EDUCATION/TRAINING PROGRAM

## 2024-08-19 PROCEDURE — 45380 COLONOSCOPY AND BIOPSY: CPT | Performed by: STUDENT IN AN ORGANIZED HEALTH CARE EDUCATION/TRAINING PROGRAM

## 2024-08-19 RX ORDER — PROPOFOL 10 MG/ML
INJECTION, EMULSION INTRAVENOUS CONTINUOUS PRN
Status: DISCONTINUED | OUTPATIENT
Start: 2024-08-19 | End: 2024-08-19

## 2024-08-19 RX ORDER — LABETALOL HYDROCHLORIDE 5 MG/ML
INJECTION, SOLUTION INTRAVENOUS AS NEEDED
Status: DISCONTINUED | OUTPATIENT
Start: 2024-08-19 | End: 2024-08-19

## 2024-08-19 RX ORDER — FENTANYL CITRATE 50 UG/ML
INJECTION, SOLUTION INTRAMUSCULAR; INTRAVENOUS AS NEEDED
Status: DISCONTINUED | OUTPATIENT
Start: 2024-08-19 | End: 2024-08-19

## 2024-08-19 RX ORDER — KETAMINE HYDROCHLORIDE 50 MG/ML
INJECTION, SOLUTION INTRAMUSCULAR; INTRAVENOUS AS NEEDED
Status: DISCONTINUED | OUTPATIENT
Start: 2024-08-19 | End: 2024-08-19

## 2024-08-19 RX ORDER — PROPOFOL 10 MG/ML
INJECTION, EMULSION INTRAVENOUS AS NEEDED
Status: DISCONTINUED | OUTPATIENT
Start: 2024-08-19 | End: 2024-08-19

## 2024-08-19 RX ORDER — SODIUM CHLORIDE, SODIUM LACTATE, POTASSIUM CHLORIDE, CALCIUM CHLORIDE 600; 310; 30; 20 MG/100ML; MG/100ML; MG/100ML; MG/100ML
125 INJECTION, SOLUTION INTRAVENOUS CONTINUOUS
Status: DISCONTINUED | OUTPATIENT
Start: 2024-08-19 | End: 2024-08-23 | Stop reason: HOSPADM

## 2024-08-19 RX ORDER — LIDOCAINE HYDROCHLORIDE 20 MG/ML
INJECTION, SOLUTION EPIDURAL; INFILTRATION; INTRACAUDAL; PERINEURAL AS NEEDED
Status: DISCONTINUED | OUTPATIENT
Start: 2024-08-19 | End: 2024-08-19

## 2024-08-19 RX ADMIN — LIDOCAINE HYDROCHLORIDE 50 MG: 20 INJECTION, SOLUTION EPIDURAL; INFILTRATION; INTRACAUDAL; PERINEURAL at 09:57

## 2024-08-19 RX ADMIN — PROPOFOL 80 MG: 10 INJECTION, EMULSION INTRAVENOUS at 09:57

## 2024-08-19 RX ADMIN — FENTANYL CITRATE 25 MCG: 50 INJECTION INTRAMUSCULAR; INTRAVENOUS at 10:03

## 2024-08-19 RX ADMIN — FENTANYL CITRATE 50 MCG: 50 INJECTION INTRAMUSCULAR; INTRAVENOUS at 10:08

## 2024-08-19 RX ADMIN — PROPOFOL 20 MG: 10 INJECTION, EMULSION INTRAVENOUS at 09:58

## 2024-08-19 RX ADMIN — KETAMINE HYDROCHLORIDE 30 MG: 50 INJECTION, SOLUTION INTRAMUSCULAR; INTRAVENOUS at 10:00

## 2024-08-19 RX ADMIN — SODIUM CHLORIDE, SODIUM LACTATE, POTASSIUM CHLORIDE, AND CALCIUM CHLORIDE 125 ML/HR: .6; .31; .03; .02 INJECTION, SOLUTION INTRAVENOUS at 08:38

## 2024-08-19 RX ADMIN — LABETALOL HYDROCHLORIDE 5 MG: 5 INJECTION, SOLUTION INTRAVENOUS at 10:15

## 2024-08-19 RX ADMIN — PROPOFOL 160 MCG/KG/MIN: 10 INJECTION, EMULSION INTRAVENOUS at 09:58

## 2024-08-19 RX ADMIN — FENTANYL CITRATE 25 MCG: 50 INJECTION INTRAMUSCULAR; INTRAVENOUS at 10:00

## 2024-08-19 RX ADMIN — PROPOFOL 20 MG: 10 INJECTION, EMULSION INTRAVENOUS at 10:10

## 2024-08-19 NOTE — ANESTHESIA PREPROCEDURE EVALUATION
Procedure:  COLONOSCOPY    Relevant Problems   CARDIO   (+) Benign essential hypertension   (+) Dyspnea on exertion   (+) Hyperlipidemia      ENDO   (+) Subclinical hypothyroidism      MUSCULOSKELETAL   (+) Primary osteoarthritis of left knee   (+) Right sciatic nerve pain      NEURO/PSYCH   (+) Major depressive disorder with single episode, in full remission (HCC)      PULMONARY   (+) Dyspnea on exertion        Physical Exam    Airway    Mallampati score: III  TM Distance: >3 FB  Neck ROM: full     Dental   No notable dental hx     Cardiovascular  Cardiovascular exam normal    Pulmonary  Pulmonary exam normal     Other Findings  post-pubertal.      Anesthesia Plan  ASA Score- 2     Anesthesia Type- IV sedation with anesthesia with ASA Monitors.         Additional Monitors:     Airway Plan:            Plan Factors-Exercise tolerance (METS): >4 METS.    Chart reviewed.    Patient summary reviewed.    Patient is not a current smoker.              Induction- intravenous.    Postoperative Plan-         Informed Consent- Anesthetic plan and risks discussed with patient.

## 2024-08-19 NOTE — H&P
History and Physical - [facility]  Cecilia Lazo 59 y.o. female MRN: [id number]                  HPI: Cecilia Lazo is a 59 y.o. year old female who presents for colon cancer screening      REVIEW OF SYSTEMS: Per the HPI, and otherwise unremarkable.    Historical Information   Past Medical History:   Diagnosis Date    Colon polyp     Hyperlipidemia     Hypertension     Menopause     takes prozac for night sweats per gyn    Night sweats     related to menopause    Pneumonia      Past Surgical History:   Procedure Laterality Date    BREAST BIOPSY Left 6yrs ago  benign    DILATION AND CURETTAGE OF UTERUS      ablation    FOOT SURGERY Left 2014    INSERTION SUBTAILOR JOINT IMPLANT Right 12/14/2018    Procedure: FOOT EXPLORATION SUBTALAR JOINT WITH IMPLANT;  Surgeon: Mariel Orona DPM;  Location:  MAIN OR;  Service: Podiatry    JOINT REPLACEMENT Right     knee    KNEE ARTHROSCOPY W/ MENISCAL REPAIR Left     KNEE SURGERY      DE ARTHRP KNE CONDYLE&PLATU MEDIAL&LAT COMPARTMENTS Right 05/11/2022    Procedure: ARTHROPLASTY KNEE TOTAL- Right total knee arthroplasty;  Surgeon: Sudhir Nassar DO;  Location: [facility];  Service: Orthopedics    DE ARTHRP KNE CONDYLE&PLATU MEDIAL&LAT COMPARTMENTS Left 4/19/2023    Procedure: ARTHROPLASTY KNEE TOTAL- Left total knee arthroplasty;  Surgeon: Sudhir Nassar DO;  [facility]: [facility];  Service: Orthopedics    DE CORRJ HLX VLGS BNCTY SESMDC DSTL METAR OSTEOT Right 12/14/2018    Procedure: FOOT TRAVIS BUNIONECTOMY;  Surgeon: Mariel Orona DPM;  Location:  MAIN OR;  Service: Podiatry    TUBAL LIGATION       Social History   Social History     Substance and Sexual Activity   Alcohol Use Not Currently    Comment: socially     Social History     Substance and Sexual Activity   Drug Use No     Social History     Tobacco Use   Smoking Status Never    Passive exposure: Never   Smokeless Tobacco Never     Family History   Problem Relation Age of Onset     "Hypertension Mother     Diabetes Mother     Hypertension Father     No Known Problems Sister     No Known Problems Daughter     No Known Problems Maternal Grandmother     No Known Problems Paternal Grandmother     No Known Problems Sister     No Known Problems Maternal Aunt     No Known Problems Paternal Aunt        Meds/Allergies       Current Outpatient Medications:     clonazePAM (KlonoPIN) 1 mg tablet    estradiol (ESTRACE) 0.1 mg/g vaginal cream    metoprolol succinate (TOPROL-XL) 100 mg 24 hr tablet    neomycin-polymyxin-dexamethasone (MAXITROL) 0.35%-10,000 units/g-0.1%    simvastatin (ZOCOR) 10 mg tablet    polyethylene glycol (GOLYTELY) 4000 mL solution    Current Facility-Administered Medications:     lactated ringers infusion, 125 mL/hr, Intravenous, Continuous, 125 mL/hr at 08/19/24 0838    No Known Allergies    Objective     BP (!) 178/111   Pulse 75   Temp 97.9 °F (36.6 °C) (Temporal)   Resp 18   Ht 5' 5.5\" (1.664 m)   Wt 85.3 kg (188 lb)   SpO2 97%   BMI 30.81 kg/m²       PHYSICAL EXAM    Gen: NAD  Head: NCAT  CV: RRR  CHEST: Clear  ABD: soft, NT/ND  EXT: no edema      ASSESSMENT/PLAN:  This is a 59 y.o. year old female here for colonoscopy, and she is stable and optimized for her procedure.        "

## 2024-08-19 NOTE — ANESTHESIA POSTPROCEDURE EVALUATION
Post-Op Assessment Note    CV Status:  Stable  Pain Score: 0    Pain management: adequate       Mental Status:  Alert and awake   Hydration Status:  Euvolemic   PONV Controlled:  Controlled   Airway Patency:  Patent     Post Op Vitals Reviewed: Yes    No anethesia notable event occurred.    Staff: CRNA               BP   128/77   Temp   97   Pulse  79   Resp   14   SpO2   99

## 2024-08-23 PROCEDURE — 88305 TISSUE EXAM BY PATHOLOGIST: CPT | Performed by: STUDENT IN AN ORGANIZED HEALTH CARE EDUCATION/TRAINING PROGRAM

## 2024-09-26 ENCOUNTER — LAB (OUTPATIENT)
Dept: LAB | Facility: CLINIC | Age: 59
End: 2024-09-26
Payer: COMMERCIAL

## 2024-09-26 DIAGNOSIS — Z13.1 SCREENING FOR DIABETES MELLITUS: ICD-10-CM

## 2024-09-26 DIAGNOSIS — E03.8 SUBCLINICAL HYPOTHYROIDISM: ICD-10-CM

## 2024-09-26 DIAGNOSIS — Z00.00 ANNUAL PHYSICAL EXAM: ICD-10-CM

## 2024-09-26 LAB
ALBUMIN SERPL BCG-MCNC: 4.6 G/DL (ref 3.5–5)
ALP SERPL-CCNC: 68 U/L (ref 34–104)
ALT SERPL W P-5'-P-CCNC: 30 U/L (ref 7–52)
ANION GAP SERPL CALCULATED.3IONS-SCNC: 8 MMOL/L (ref 4–13)
AST SERPL W P-5'-P-CCNC: 23 U/L (ref 13–39)
BASOPHILS # BLD AUTO: 0.06 THOUSANDS/ΜL (ref 0–0.1)
BASOPHILS NFR BLD AUTO: 1 % (ref 0–1)
BILIRUB SERPL-MCNC: 0.56 MG/DL (ref 0.2–1)
BUN SERPL-MCNC: 15 MG/DL (ref 5–25)
CALCIUM SERPL-MCNC: 9.3 MG/DL (ref 8.4–10.2)
CHLORIDE SERPL-SCNC: 104 MMOL/L (ref 96–108)
CHOLEST SERPL-MCNC: 247 MG/DL
CO2 SERPL-SCNC: 26 MMOL/L (ref 21–32)
CREAT SERPL-MCNC: 0.79 MG/DL (ref 0.6–1.3)
EOSINOPHIL # BLD AUTO: 0.2 THOUSAND/ΜL (ref 0–0.61)
EOSINOPHIL NFR BLD AUTO: 4 % (ref 0–6)
ERYTHROCYTE [DISTWIDTH] IN BLOOD BY AUTOMATED COUNT: 13.3 % (ref 11.6–15.1)
EST. AVERAGE GLUCOSE BLD GHB EST-MCNC: 105 MG/DL
GFR SERPL CREATININE-BSD FRML MDRD: 81 ML/MIN/1.73SQ M
GLUCOSE P FAST SERPL-MCNC: 95 MG/DL (ref 65–99)
HBA1C MFR BLD: 5.3 %
HCT VFR BLD AUTO: 41 % (ref 34.8–46.1)
HDLC SERPL-MCNC: 56 MG/DL
HGB BLD-MCNC: 13.1 G/DL (ref 11.5–15.4)
IMM GRANULOCYTES # BLD AUTO: 0.02 THOUSAND/UL (ref 0–0.2)
IMM GRANULOCYTES NFR BLD AUTO: 0 % (ref 0–2)
LDLC SERPL CALC-MCNC: 136 MG/DL (ref 0–100)
LYMPHOCYTES # BLD AUTO: 1.57 THOUSANDS/ΜL (ref 0.6–4.47)
LYMPHOCYTES NFR BLD AUTO: 31 % (ref 14–44)
MCH RBC QN AUTO: 29 PG (ref 26.8–34.3)
MCHC RBC AUTO-ENTMCNC: 32 G/DL (ref 31.4–37.4)
MCV RBC AUTO: 91 FL (ref 82–98)
MONOCYTES # BLD AUTO: 0.37 THOUSAND/ΜL (ref 0.17–1.22)
MONOCYTES NFR BLD AUTO: 7 % (ref 4–12)
NEUTROPHILS # BLD AUTO: 2.85 THOUSANDS/ΜL (ref 1.85–7.62)
NEUTS SEG NFR BLD AUTO: 57 % (ref 43–75)
NRBC BLD AUTO-RTO: 0 /100 WBCS
PLATELET # BLD AUTO: 148 THOUSANDS/UL (ref 149–390)
PMV BLD AUTO: 10 FL (ref 8.9–12.7)
POTASSIUM SERPL-SCNC: 3.9 MMOL/L (ref 3.5–5.3)
PROT SERPL-MCNC: 7.7 G/DL (ref 6.4–8.4)
RBC # BLD AUTO: 4.52 MILLION/UL (ref 3.81–5.12)
SODIUM SERPL-SCNC: 138 MMOL/L (ref 135–147)
T4 FREE SERPL-MCNC: 0.65 NG/DL (ref 0.61–1.12)
TRIGL SERPL-MCNC: 273 MG/DL
TSH SERPL DL<=0.05 MIU/L-ACNC: 7.87 UIU/ML (ref 0.45–4.5)
WBC # BLD AUTO: 5.07 THOUSAND/UL (ref 4.31–10.16)

## 2024-09-26 PROCEDURE — 84439 ASSAY OF FREE THYROXINE: CPT

## 2024-09-26 PROCEDURE — 36415 COLL VENOUS BLD VENIPUNCTURE: CPT

## 2024-09-26 PROCEDURE — 80061 LIPID PANEL: CPT

## 2024-09-26 PROCEDURE — 84443 ASSAY THYROID STIM HORMONE: CPT

## 2024-09-26 PROCEDURE — 85025 COMPLETE CBC W/AUTO DIFF WBC: CPT

## 2024-09-26 PROCEDURE — 83036 HEMOGLOBIN GLYCOSYLATED A1C: CPT

## 2024-09-26 PROCEDURE — 80053 COMPREHEN METABOLIC PANEL: CPT

## 2024-09-27 DIAGNOSIS — I10 BENIGN ESSENTIAL HYPERTENSION: ICD-10-CM

## 2024-09-27 DIAGNOSIS — F43.9 STRESS AT HOME: ICD-10-CM

## 2024-09-27 DIAGNOSIS — E78.2 MIXED HYPERLIPIDEMIA: ICD-10-CM

## 2024-09-27 RX ORDER — SIMVASTATIN 10 MG
10 TABLET ORAL
Qty: 90 TABLET | Refills: 1 | Status: CANCELLED | OUTPATIENT
Start: 2024-09-27

## 2024-09-27 NOTE — RESULT ENCOUNTER NOTE
Please let her know her cholesterol is trending up. Ask if she is taking her statin. Also her tsh level is trending upwards. Should start synthroid, low dose. Please let me know her response for treatment plan

## 2024-09-27 NOTE — TELEPHONE ENCOUNTER
Patient wants to know if you want to higher the statin. Tsh patient does not want to take medication for. Patient want to have 6 month refill.

## 2024-09-28 DIAGNOSIS — E78.2 MIXED HYPERLIPIDEMIA: ICD-10-CM

## 2024-09-28 RX ORDER — CLONAZEPAM 1 MG/1
1 TABLET ORAL DAILY PRN
Qty: 30 TABLET | Refills: 0 | Status: SHIPPED | OUTPATIENT
Start: 2024-09-28

## 2024-09-28 RX ORDER — SIMVASTATIN 20 MG
20 TABLET ORAL
Qty: 90 TABLET | Refills: 1 | Status: SHIPPED | OUTPATIENT
Start: 2024-09-28

## 2024-09-28 RX ORDER — METOPROLOL SUCCINATE 100 MG/1
100 TABLET, EXTENDED RELEASE ORAL DAILY
Qty: 90 TABLET | Refills: 1 | Status: SHIPPED | OUTPATIENT
Start: 2024-09-28

## 2024-10-02 DIAGNOSIS — E03.8 SUBCLINICAL HYPOTHYROIDISM: Primary | ICD-10-CM

## 2024-10-02 RX ORDER — LEVOTHYROXINE SODIUM 50 UG/1
50 TABLET ORAL
Qty: 60 TABLET | Refills: 0 | Status: SHIPPED | OUTPATIENT
Start: 2024-10-02

## 2024-11-01 ENCOUNTER — TELEPHONE (OUTPATIENT)
Age: 59
End: 2024-11-01

## 2024-11-20 ENCOUNTER — RESULTS FOLLOW-UP (OUTPATIENT)
Dept: FAMILY MEDICINE CLINIC | Facility: CLINIC | Age: 59
End: 2024-11-20

## 2024-11-25 DIAGNOSIS — E03.8 SUBCLINICAL HYPOTHYROIDISM: ICD-10-CM

## 2024-11-25 NOTE — TELEPHONE ENCOUNTER
Reason for call: Ghada Alonzo, manage this medication!    [x] Refill   [] Prior Auth  [] Other:     Office:   [x] PCP/Provider -   [] Specialty/Provider -     Medication:     levothyroxine 50 mcg tablet       Dose/Frequency: Take 1 tablet (50 mcg total) by mouth daily in the early morning,     Quantity: 60 tablet     Pharmacy: 60 tablet     Does the patient have enough for 3 days?   [x] Yes   [] No - Send as HP to POD

## 2024-11-26 RX ORDER — LEVOTHYROXINE SODIUM 50 UG/1
50 TABLET ORAL
Qty: 60 TABLET | Refills: 5 | Status: SHIPPED | OUTPATIENT
Start: 2024-11-26

## 2025-01-29 DIAGNOSIS — F43.9 STRESS AT HOME: ICD-10-CM

## 2025-01-29 DIAGNOSIS — E03.8 SUBCLINICAL HYPOTHYROIDISM: ICD-10-CM

## 2025-01-29 DIAGNOSIS — E78.2 MIXED HYPERLIPIDEMIA: ICD-10-CM

## 2025-01-29 DIAGNOSIS — I10 BENIGN ESSENTIAL HYPERTENSION: ICD-10-CM

## 2025-01-29 RX ORDER — CLONAZEPAM 1 MG/1
1 TABLET ORAL DAILY PRN
Qty: 30 TABLET | Refills: 0 | Status: SHIPPED | OUTPATIENT
Start: 2025-01-29

## 2025-01-29 RX ORDER — SIMVASTATIN 20 MG
20 TABLET ORAL
Qty: 90 TABLET | Refills: 1 | Status: SHIPPED | OUTPATIENT
Start: 2025-01-29

## 2025-01-29 RX ORDER — METOPROLOL SUCCINATE 100 MG/1
100 TABLET, EXTENDED RELEASE ORAL DAILY
Qty: 90 TABLET | Refills: 1 | Status: SHIPPED | OUTPATIENT
Start: 2025-01-29

## 2025-01-29 RX ORDER — LEVOTHYROXINE SODIUM 50 UG/1
50 TABLET ORAL
Qty: 90 TABLET | Refills: 1 | Status: SHIPPED | OUTPATIENT
Start: 2025-01-29

## 2025-01-29 NOTE — TELEPHONE ENCOUNTER
Patient did call in regarding the status of medication, did let her know they're in process and they should be sent over for her soon.

## 2025-01-29 NOTE — TELEPHONE ENCOUNTER
Patient would like prescriptions sent to [place]    Reason for call:   [x] Refill   [] Prior Auth  [] Other:     Office:   [x] PCP/Provider - Ghada Alonzo DNP   [] Specialty/Provider -     Medication:  clonazePAM (KlonoPIN) 1 mg tablet    Dose/Frequency: katerine 1 tablet (1 mg total) by mouth daily as needed for anxiety,     Quantity: 30    Pharmacy: [place] [id number] [address] [place]      Does the patient have enough for 3 days?   [] Yes   [] No - Send as HP to POD    Reason for call:   [] Refill   [] Prior Auth  [] Other:     Office:   [] PCP/Provider -   [] Specialty/Provider -     Medication:  metoprolol succinate (TOPROL-XL) 100 mg 24 hr tablet    Dose/Frequency: Take 1 tablet (100 mg total) by mouth daily,     Quantity: 90    Pharmacy: [place] [address] [address] [place]      Does the patient have enough for 3 days?   [] Yes   [] No - Send as HP to POD    Reason for call:   [] Refill   [] Prior Auth  [] Other:     Office:   [] PCP/Provider -   [] Specialty/Provider -     Medication:  simvastatin (ZOCOR) 20 mg tablet    Dose/Frequency: Take 1 tablet (20 mg total) by mouth daily at bedtime     Quantity: 90    Pharmacy: [place] [id number] [address] [place]      Does the patient have enough for 3 days?   [] Yes   [] No - Send as HP to POD    Reason for call:   [] Refill   [] Prior Auth  [] Other:     Office:   [] PCP/Provider -   [] Specialty/Provider -     Medication: levothyroxine 50 mcg tablet     Dose/Frequency:  Take 1 tablet (50 mcg total) by mouth daily in the early morning     Quantity: 60    Pharmacy: [place] [address] [address] [place]      Does the patient have enough for 3 days?   [] Yes   [] No - Send as HP to POD

## 2025-01-30 RX ORDER — CLONAZEPAM 1 MG/1
TABLET ORAL
Qty: 30 TABLET | Refills: 0 | OUTPATIENT
Start: 2025-01-30

## 2025-04-16 ENCOUNTER — APPOINTMENT (OUTPATIENT)
Dept: RADIOLOGY | Facility: CLINIC | Age: 60
End: 2025-04-16
Attending: ORTHOPAEDIC SURGERY
Payer: COMMERCIAL

## 2025-04-16 ENCOUNTER — OFFICE VISIT (OUTPATIENT)
Dept: OBGYN CLINIC | Facility: CLINIC | Age: 60
End: 2025-04-16
Payer: COMMERCIAL

## 2025-04-16 VITALS — HEIGHT: 66 IN | BODY MASS INDEX: 30.81 KG/M2

## 2025-04-16 DIAGNOSIS — M54.50 RIGHT-SIDED LOW BACK PAIN WITHOUT SCIATICA, UNSPECIFIED CHRONICITY: ICD-10-CM

## 2025-04-16 DIAGNOSIS — Z96.651 S/P TOTAL KNEE REPLACEMENT, RIGHT: Primary | ICD-10-CM

## 2025-04-16 DIAGNOSIS — Z96.652 S/P TOTAL KNEE REPLACEMENT, LEFT: ICD-10-CM

## 2025-04-16 DIAGNOSIS — Z96.651 S/P TOTAL KNEE REPLACEMENT, RIGHT: ICD-10-CM

## 2025-04-16 PROCEDURE — 99213 OFFICE O/P EST LOW 20 MIN: CPT | Performed by: ORTHOPAEDIC SURGERY

## 2025-04-16 PROCEDURE — 73560 X-RAY EXAM OF KNEE 1 OR 2: CPT

## 2025-04-16 PROCEDURE — 72100 X-RAY EXAM L-S SPINE 2/3 VWS: CPT

## 2025-04-16 NOTE — PROGRESS NOTES
Name: Cecilia Lazo      : 1965      MRN: [id number]  Encounter Provider: Sudhir Nassar DO  Encounter Date: 2025   Encounter department: [facility] [place]  :  Assessment & Plan  S/P total knee replacement, right    Orders:  •  XR knee 1 or 2 vw right; Future    S/P total knee replacement, left    Orders:  •  XR knee 1 or 2 vw left; Future    Right-sided low back pain without sciatica, unspecified chronicity    Orders:  •  XR spine lumbar 2 or 3 views injury; Future  •  Ambulatory Referral to Physical Therapy; Future        Approximately 3 years s/p Right total knee arthoplasty performed on 2022, approximately 2 years s/p Left total knee arthroplasty performed on 2023, low back pain.   X-rays reviewed and discussed with patient  Patient to be full weightbearing to BLE (Bilateral Lower Extremity)  ROM as tolerated to  BLE (Bilateral Lower Extremity)  Discussed with patient she has no restrictions at this time.   Patient to continue at home analgesic regimen with Tylenol as needed.   Patient advised if she has any fevers, chills, increase in swelling to call the office for re-evaluation.   The patient was referred to physical therapy for her lumbar symptoms.   If symptoms persist, I will consider referring her to pain management.   Patient to follow up as needed.          History of the Present Illness   History of Present Illness   HPI   Cecilia Lazo is a 59 y.o. female approximately 3 years s/p Right total knee arthoplasty performed on 2022, approximately 2 years s/p Left total knee arthroplasty performed on 2023. The patient notes intermittent swelling of the left knee. She does not recall any specific injuries or factors that increase her symptoms. The patient also notes low back pain on the right. She previously attended physical therapy and completed this in 2024. She denies any numbness or tingling. She has also tried  "chiropractic care.       Review of Systems     Review of Systems   Constitutional:  Negative for appetite change and unexpected weight change.   HENT:  Negative for congestion and trouble swallowing.    Eyes:  Negative for visual disturbance.   Respiratory:  Negative for cough and shortness of breath.    Cardiovascular:  Negative for chest pain and palpitations.   Gastrointestinal:  Negative for nausea and vomiting.   Endocrine: Negative for cold intolerance and heat intolerance.   Musculoskeletal:  Negative for joint swelling and myalgias.   Skin:  Negative for rash.   Neurological:  Negative for numbness.       Physical Exam   Objective   Ht 5' 5.5\" (1.664 m)   BMI 30.81 kg/m²        Left Knee   Surgical incision well healed   Range of motion from 0 to 125 degrees   There is no effusion.    There is no tenderness over the knee.    The patient is able to perform a straight leg raise with 5/5 quad strength.     Varus stress testing reveals no pain or instability at 0 and 30 degrees   Valgus stress testing reveals no pain or instability at 0 and 30 degrees  Calf soft, compressible and nontender  The patient is neurovascular intact distally.     Right knee  Surgical incision well healed   There is no effusion  Range of motion 0 to 125 degrees  No tenderness over the knee  Able to perform straight leg raise and 5/5 quad strength  Varus stress testing reveals no pain or instability at 0 and 30 degrees   Valgus stress testing reveals no pain or instability at 0 and 30 degrees  Calf soft, compressible and nontender  Neurovascularly intact distally    Lumbar spine  There is no midline tenderness present.    There is right paraspinal discomfort but non-tender on exam.  There is right SI joint tenderness present     Sensation intact to light touch left L2 through S1 dermatomes.   Sensation intact to light touch right L2 through S1 dermatomes   Left Motor: 5/5 iliopsoas, 5/5 quadriceps, 5/5 tibialis anterior, 5/5 extensor " hallucis longus, and 5/5 gastrocsoleus.   Right Motor: 5/5 iliopsoas, 5/5 quadriceps, 5/5 tibialis anterior, 5/5 extensor hallucis longus, and 5/5 gastrocsoleus.   Straight leg raise test is negative Bilateral   The patient is well perfused distally.      Data Review     I have personally reviewed pertinent films in PACS, and my interpretation follows.    X-rays taken 4/16/2025 of Left knee independently reviewed and demonstrate total knee arthroplasty in anatomic alignment without evidence of loosening or failure. No acute fracture, osseous abnormality or fracture.      X-rays taken 4/16/2025 of Right knee independently reviewed and demonstrate total knee arthroplasty in anatomic alignment without evidence of loosening or failure. No acute fracture, osseous abnormality or fracture.      X-rays of lumbar spine  taken 04/16/25 independently reviewed and demonstrate mild curvature of the thoracolumbar spine. Multilevel degenerative disc disease, most notable at L5-S1. Facet arthropathy present. No acute fracture or osseous abnormality.      Previous [employer] results reviewed.     Lab Results   Component Value Date/Time    HGBA1C 5.3 09/26/2024 09:22 AM    ESR 18 09/29/2023 08:06 AM    CRP 3.0 (H) 09/29/2023 08:06 AM       Social History     Tobacco Use   • Smoking status: Never     Passive exposure: Never   • Smokeless tobacco: Never   Vaping Use   • Vaping status: Never Used   Substance Use Topics   • Alcohol use: Not Currently     Comment: socially   • Drug use: No           Procedures  None performed.     Latha Yi   Scribe Attestation      I,:  Latha Yi am acting as a scribe while in the presence of the attending physician.:       I,:  Sudhir Nassar DO personally performed the services described in this documentation    as scribed in my presence.:

## 2025-04-29 DIAGNOSIS — E78.2 MIXED HYPERLIPIDEMIA: ICD-10-CM

## 2025-04-29 DIAGNOSIS — I10 BENIGN ESSENTIAL HYPERTENSION: ICD-10-CM

## 2025-04-29 DIAGNOSIS — F43.9 STRESS AT HOME: ICD-10-CM

## 2025-04-29 DIAGNOSIS — E03.8 SUBCLINICAL HYPOTHYROIDISM: ICD-10-CM

## 2025-04-29 RX ORDER — LEVOTHYROXINE SODIUM 50 UG/1
50 TABLET ORAL
Qty: 90 TABLET | Refills: 1 | Status: SHIPPED | OUTPATIENT
Start: 2025-04-29

## 2025-04-29 RX ORDER — METOPROLOL SUCCINATE 100 MG/1
100 TABLET, EXTENDED RELEASE ORAL DAILY
Qty: 90 TABLET | Refills: 1 | Status: SHIPPED | OUTPATIENT
Start: 2025-04-29

## 2025-04-29 RX ORDER — SIMVASTATIN 20 MG
20 TABLET ORAL
Qty: 90 TABLET | Refills: 1 | Status: SHIPPED | OUTPATIENT
Start: 2025-04-29

## 2025-04-29 NOTE — TELEPHONE ENCOUNTER
- Pharmacy Change    Reason for call:   [x] Refill   [] Prior Auth  [] Other:     Office:   [x] PCP/Provider -   [] Specialty/Provider -     Medication:   - Clonazepam 1 mg- take 1 tablet by mouth daily as needed   - Levothyroxine 50 mcg- take 1 tablet by mouth daily in the early morning   - Metoprolol 100 mg- take 1 tablet by mouth daily  - Simvastatin 20 mg- take 1 tablet by mouth daily at bedtime      [facility]   Does the patient have enough for 3 days?   [x] Yes   [] No - Send as HP to POD    Mail Away Pharmacy   Does the patient have enough for 10 days?   [] Yes   [] No - Send as HP to POD

## 2025-04-30 RX ORDER — CLONAZEPAM 1 MG/1
1 TABLET ORAL DAILY PRN
Qty: 30 TABLET | Refills: 0 | Status: SHIPPED | OUTPATIENT
Start: 2025-04-30